# Patient Record
Sex: MALE | Race: WHITE
[De-identification: names, ages, dates, MRNs, and addresses within clinical notes are randomized per-mention and may not be internally consistent; named-entity substitution may affect disease eponyms.]

---

## 2018-09-11 ENCOUNTER — HOSPITAL ENCOUNTER (EMERGENCY)
Dept: HOSPITAL 97 - ER | Age: 53
LOS: 1 days | Discharge: HOME | End: 2018-09-12
Payer: COMMERCIAL

## 2018-09-11 DIAGNOSIS — R07.9: Primary | ICD-10-CM

## 2018-09-11 DIAGNOSIS — E03.9: ICD-10-CM

## 2018-09-11 LAB
ALBUMIN SERPL BCP-MCNC: 4 G/DL (ref 3.4–5)
ALP SERPL-CCNC: 48 U/L (ref 45–117)
ALT SERPL W P-5'-P-CCNC: 24 U/L (ref 12–78)
AST SERPL W P-5'-P-CCNC: 18 U/L (ref 15–37)
BUN BLD-MCNC: 20 MG/DL (ref 7–18)
CKMB CREATINE KINASE MB: 2.3 NG/ML (ref 0.3–3.6)
GLUCOSE SERPLBLD-MCNC: 105 MG/DL (ref 74–106)
HCT VFR BLD CALC: 40.2 % (ref 39.6–49)
INR BLD: 1.05
LYMPHOCYTES # SPEC AUTO: 2.3 K/UL (ref 0.7–4.9)
MAGNESIUM SERPL-MCNC: 2.2 MG/DL (ref 1.8–2.4)
MCH RBC QN AUTO: 32.8 PG (ref 27–35)
MCV RBC: 94.3 FL (ref 80–100)
NT-PROBNP SERPL-MCNC: 34 PG/ML (ref ?–125)
PMV BLD: 9.3 FL (ref 7.6–11.3)
POTASSIUM SERPL-SCNC: 3.7 MMOL/L (ref 3.5–5.1)
RBC # BLD: 4.26 M/UL (ref 4.33–5.43)
TROPONIN (EMERG DEPT USE ONLY): < 0.02 NG/ML (ref 0–0.04)

## 2018-09-11 PROCEDURE — 99284 EMERGENCY DEPT VISIT MOD MDM: CPT

## 2018-09-11 PROCEDURE — 85730 THROMBOPLASTIN TIME PARTIAL: CPT

## 2018-09-11 PROCEDURE — 85025 COMPLETE CBC W/AUTO DIFF WBC: CPT

## 2018-09-11 PROCEDURE — 82550 ASSAY OF CK (CPK): CPT

## 2018-09-11 PROCEDURE — 83735 ASSAY OF MAGNESIUM: CPT

## 2018-09-11 PROCEDURE — 36415 COLL VENOUS BLD VENIPUNCTURE: CPT

## 2018-09-11 PROCEDURE — 83880 ASSAY OF NATRIURETIC PEPTIDE: CPT

## 2018-09-11 PROCEDURE — 80048 BASIC METABOLIC PNL TOTAL CA: CPT

## 2018-09-11 PROCEDURE — 80076 HEPATIC FUNCTION PANEL: CPT

## 2018-09-11 PROCEDURE — 85610 PROTHROMBIN TIME: CPT

## 2018-09-11 PROCEDURE — 85379 FIBRIN DEGRADATION QUANT: CPT

## 2018-09-11 PROCEDURE — 84439 ASSAY OF FREE THYROXINE: CPT

## 2018-09-11 PROCEDURE — 93005 ELECTROCARDIOGRAM TRACING: CPT

## 2018-09-11 PROCEDURE — 84443 ASSAY THYROID STIM HORMONE: CPT

## 2018-09-11 PROCEDURE — 84484 ASSAY OF TROPONIN QUANT: CPT

## 2018-09-11 PROCEDURE — 71045 X-RAY EXAM CHEST 1 VIEW: CPT

## 2018-09-11 PROCEDURE — 82553 CREATINE MB FRACTION: CPT

## 2018-09-12 LAB — TSH SERPL DL<=0.05 MIU/L-ACNC: 6.23 UIU/ML (ref 0.36–3.74)

## 2018-09-12 NOTE — RAD REPORT
EXAM DESCRIPTION:  RAD - Chest Single View - 9/11/2018 11:52 pm

 

CLINICAL HISTORY:  CHEST PAIN

Chest pain.

 

COMPARISON:  Chest Pa And Lat (2 Views) dated 7/29/2016; CHEST PA AND LAT 2 VIEW dated 6/30/2008

 

FINDINGS:  Portable technique limits examination quality.

 

The lungs are grossly clear. The heart is normal in size. No displaced fractures.

 

IMPRESSION:  No acute intrathoracic process suspected.

## 2018-09-12 NOTE — EKG
Test Date:    2018-09-11               Test Time:    22:40:39

Technician:   PREMA                                    

                                                     

MEASUREMENT RESULTS:                                       

Intervals:                                           

Rate:         55                                     

VT:           146                                    

QRSD:         100                                    

QT:           454                                    

QTc:          434                                    

Axis:                                                

P:            45                                     

VT:           146                                    

QRS:          46                                     

T:            30                                     

                                                     

INTERPRETIVE STATEMENTS:                                       

                                                     

Sinus bradycardia

Otherwise normal ECG

Compared to ECG 10/08/2013 11:17:19

No significant changes



Electronically Signed On 09-12-18 12:08:43 CDT by Tulio Henry

## 2018-09-12 NOTE — ER
Nurse's Notes                                                                                     

 [facility]                                                                

Name: Refugio Castro                                                                                 

Age: 53 yrs                                                                                       

Sex: Male                                                                                         

: 1965                                                                                   

MRN: [id number]                                                                                   

Arrival Date: 2018                                                                          

Time: 22:14                                                                                       

Account#: [id number]                                                                            

Bed 26                                                                                            

Private MD: Meño Staples B                                                                     

Diagnosis: Chest pain                                                                             

                                                                                                  

Presentation:                                                                                     

                                                                                             

22:35 Presenting complaint: Patient states: he started feeling his heart race, felt dizzy     bb  

      with chest pain radiating down left arm, took his blood pressure several times and it       

      was elevated for him 130s/80s. Pt states his mother-in-law just  a few hours ago.       

      Pt does have an appointment with Dr Henry next week. Has been having similar symptoms     

      intermittently over the last 3 weeks. Transition of care: patient was not received from     

      another setting of care. Onset of symptoms was 2018. Risk Assessment: Do      

      you want to hurt yourself or someone else? Patient reports no desire to harm self or        

      others. Initial Sepsis Screen: Does the patient meet any 2 criteria? No. Patient's          

      initial sepsis screen is negative. Does the patient have a suspected source of              

      infection? No. Patient's initial sepsis screen is negative. Care prior to arrival: None.    

22:35 Method Of Arrival: Ambulatory                                                           bb  

22:35 Acuity: ELIAS 3                                                                           bb  

                                                                                                  

Historical:                                                                                       

- Allergies:                                                                                      

23:01 No Known Allergies;                                                                     bb  

- Home Meds:                                                                                      

23:01 levothyroxine oral [Active]; nasal spray [Active];                                      bb  

- PMHx:                                                                                           

23:01 Hypothyroidism;                                                                         bb  

- PSHx:                                                                                           

23:01 varicose veins; Knee surgery;                                                           bb  

                                                                                                  

- Immunization history:: Adult Immunizations up to date.                                          

- Social history:: Smoking status: Patient/guardian denies using tobacco,                         

  Patient/guardian denies using alcohol, street drugs.                                            

- Ebola Screening: : No symptoms or risks identified at this time.                                

                                                                                                  

                                                                                                  

Screenin:16 Abuse screen: Denies threats or abuse. Denies injuries from another. Nutritional        mg2 

      screening: No deficits noted. Tuberculosis screening: No symptoms or risk factors           

      identified. Fall Risk IV access (20 points).                                                

                                                                                                  

Assessment:                                                                                       

23:16 General: Appears in no apparent distress. comfortable, Behavior is calm, cooperative.   mg2 

      Pain: Denies pain. Neuro: Level of Consciousness is awake, alert, obeys commands,           

      Oriented to person, place, time, situation. Cardiovascular: Capillary refill < 3            

      seconds Patient's skin is warm and dry. Respiratory: Airway is patent Respiratory           

      effort is even, unlabored, Respiratory pattern is regular, symmetrical. GI: No signs        

      and/or symptoms were reported involving the gastrointestinal system. : No signs           

      and/or symptoms were reported regarding the genitourinary system. EENT: No signs and/or     

      symptoms were reported regarding the EENT system. Derm: Skin is intact, Skin is pink,       

      warm \T\ dry. normal. Musculoskeletal: No signs and/or symptoms reported regarding the      

      musculoskeletal system.                                                                     

                                                                                             

00:40 Reassessment: No changes from previously documented assessment. Patient and/or family   tl3 

      updated on plan of care and expected duration. Pain level reassessed. Patient is alert,     

      oriented x 3, equal unlabored respirations, skin warm/dry/pink.                             

01:47 Reassessment: Patient appears in no apparent distress at this time. Patient and/or      mg2 

      family updated on plan of care and expected duration. Pain level reassessed. Patient is     

      alert, oriented x 3, equal unlabored respirations, skin warm/dry/pink.                      

                                                                                                  

Vital Signs:                                                                                      

                                                                                             

22:30  / 87; Pulse 54; Resp 16 S; Temp 98.2(O); Pulse Ox 97% on R/A; Weight 77.11 kg    bb  

      (R); Height 6 ft. 2 in. (187.96 cm) (R); Pain 4/10;                                         

                                                                                             

00:40  / 68; Pulse 53; Resp 18; Pulse Ox 97% ;                                          tl3 

01:46  / 60; Pulse 50; Resp 18; Pulse Ox 100% on R/A; Pain 0/10;                        mg2 

                                                                                             

22:30 Body Mass Index 21.83 (77.11 kg, 187.96 cm)                                             bb  

                                                                                                  

ED Course:                                                                                        

                                                                                             

22:14 Patient arrived in ED.                                                                  ds1 

22:15 Meño Staples MD is Private Physician.                                                ds1 

22:30 Arm band placed on Patient placed in an exam room, on a stretcher, on cardiac monitor,  bb  

      on pulse oximetry. EKG completed in triage. Results shown to MD.                            

22:56 Triage completed.                                                                       bb  

23:00 Julio C Lowery MD is Attending Physician.                                                rn  

23:08 Attending Physician role handed off by Julio C Lowery MD                                 pkl 

23:08 Rajan Ledbetter MD is Attending Physician.                                                    pkl 

23:09 Anthony Tam, RN is Primary Nurse.                                                  mg2 

23:17 No provider procedures requiring assistance completed. Inserted saline lock: 20 gauge   mg2 

      in left antecubital area, using aseptic technique. Blood collected.                         

23:18 Patient has correct armband on for positive identification. Cardiac monitor on. Pulse   mg2 

      ox on. NIBP on.                                                                             

23:52 XRAY Chest (1 view) In Process Unspecified.                                             EDMS

                                                                                             

00:20 X-ray completed. Portable x-ray completed in exam room. Patient tolerated procedure     kw  

      well.                                                                                       

01:37 Tulio Henry MD is Referral Physician.                                               pkl 

01:46 IV discontinued, intact, bleeding controlled, No redness/swelling at site. Pressure     mg2 

      dressing applied.                                                                           

                                                                                                  

Administered Medications:                                                                         

                                                                                             

23:14 Drug: Aspirin 162 mg Route: PO;                                                         mg2 

                                                                                             

01:35 Follow up: Response: No adverse reaction; Marked relief of symptoms                     mg2 

                                                                                                  

                                                                                                  

Outcome:                                                                                          

01:37 Discharge ordered by MD.                                                                pkl 

01:46 Discharged to home ambulatory.                                                          mg2 

01:46 Condition: stable                                                                           

01:46 Discharge instructions given to patient, Instructed on discharge instructions, follow       

      up and referral plans. Demonstrated understanding of instructions, follow-up care.          

01:47 Patient left the ED.                                                                    mg2 

                                                                                                  

Signatures:                                                                                       

Dispatcher [employer]                           EDMS                                                 

Rajan Ledbetter MD MD   pkl                                                  

Marimar Najera                                ds1                                                  

Berta Morin, RN                     RN   bb                                                   

Julio C Lowery MD MD rn Whitley, Kimberlee kw Lowrey, Tammy, RN                       RN   tl3                                                  

Anthony Tam, KIMO                    RN   mg2                                                  

                                                                                                  

**************************************************************************************************

## 2018-09-12 NOTE — EKG
Test Date:    2018-09-12               Test Time:    01:28:08

Technician:   MG                                     

                                                     

MEASUREMENT RESULTS:                                       

Intervals:                                           

Rate:         46                                     

WY:           148                                    

QRSD:         100                                    

QT:           476                                    

QTc:          416                                    

Axis:                                                

P:            57                                     

WY:           148                                    

QRS:          65                                     

T:            47                                     

                                                     

INTERPRETIVE STATEMENTS:                                       

                                                     

Marked sinus bradycardia

Abnormal ECG

Compared to ECG 10/08/2013 11:17:19

No significant changes



Electronically Signed On 09-12-18 12:08:35 CDT by Tulio Henry

## 2018-09-12 NOTE — EDPHYS
Physician Documentation                                                                           

 [facility]                                                                

Name: Refugio Castro                                                                                 

Age: 53 yrs                                                                                       

Sex: Male                                                                                         

: 1965                                                                                   

MRN: [id number]                                                                                   

Arrival Date: 2018                                                                          

Time: 22:14                                                                                       

Account#: [id number]                                                                            

Bed 26                                                                                            

Private MD: Meño Staples B                                                                     

ED Physician Rajan Ledbetter                                                                             

HPI:                                                                                              

                                                                                             

23:08 This 53 yrs old  Male presents to ER via Ambulatory with complaints of High    pkl 

      Blood Pressure.                                                                             

23:08 The patient or guardian reports chest pain that is located primarily in the substernal  pkl 

      area. Onset: just prior to arrival. The pain radiates to the left arm. Associated signs     

      and symptoms: The patient has no apparent associated signs or symptoms. The chest pain      

      is described as dull. The patient has experienced similar episodes in the past, several     

      times, Over the last 3 weeks.                                                               

                                                                                                  

Historical:                                                                                       

- Allergies:                                                                                      

23:01 No Known Allergies;                                                                     bb  

- Home Meds:                                                                                      

23:01 levothyroxine oral [Active]; nasal spray [Active];                                      bb  

- PMHx:                                                                                           

23:01 Hypothyroidism;                                                                         bb  

- PSHx:                                                                                           

23:01 varicose veins; Knee surgery;                                                           bb  

                                                                                                  

- Immunization history:: Adult Immunizations up to date.                                          

- Social history:: Smoking status: Patient/guardian denies using tobacco,                         

  Patient/guardian denies using alcohol, street drugs.                                            

- Ebola Screening: : No symptoms or risks identified at this time.                                

                                                                                                  

                                                                                                  

ROS:                                                                                              

23:08 Eyes: Negative for injury, pain, redness, and discharge, ENT: Negative for injury,      pkl 

      pain, and discharge, Neck: Negative for injury, pain, and swelling.                         

23:08 Cardiovascular: Positive for chest pain.                                                    

23:08 Respiratory: Negative for cough, shortness of breath.                                       

23:08 Abdomen/GI: Negative for abdominal pain, nausea, vomiting, and diarrhea.                    

23:08 Back: Negative for acute changes.                                                           

23:08 : Negative for urinary symptoms.                                                          

23:08 MS/extremity: Negative for acute changes.                                                   

23:08 Skin: Negative for rash.                                                                    

23:08 Neuro: Negative for altered mental status.                                                  

                                                                                                  

Exam:                                                                                             

23:08 Head/Face:  Normocephalic, atraumatic. Eyes:  Pupils equal round and reactive to light, pkl 

      extra-ocular motions intact.  Lids and lashes normal.  Conjunctiva and sclera are           

      non-icteric and not injected.  Cornea within normal limits.  Periorbital areas with no      

      swelling, redness, or edema. ENT:  Nares patent. No nasal discharge, no septal              

      abnormalities noted.  Tympanic membranes are normal and external auditory canals are        

      clear.  Oropharynx with no redness, swelling, or masses, exudates, or evidence of           

      obstruction, uvula midline.  Mucous membranes moist. Neck:  Trachea midline, no             

      thyromegaly or masses palpated, and no cervical lymphadenopathy.  Supple, full range of     

      motion without nuchal rigidity, or vertebral point tenderness.  No Meningismus.             

      Chest/axilla:  Normal chest wall appearance and motion.  Nontender with no deformity.       

      No lesions are appreciated. Cardiovascular:  Regular rate and rhythm with a normal S1       

      and S2.  No gallops, murmurs, or rubs.  Normal PMI, no JVD.  No pulse deficits.             

      Respiratory:  Lungs have equal breath sounds bilaterally, clear to auscultation and         

      percussion.  No rales, rhonchi or wheezes noted.  No increased work of breathing, no        

      retractions or nasal flaring. Abdomen/GI:  Soft, non-tender, with normal bowel sounds.      

      No distension or tympany.  No guarding or rebound.  No evidence of tenderness               

      throughout. Back:  No spinal tenderness.  No costovertebral tenderness.  Full range of      

      motion. Skin:  Warm, dry with normal turgor.  Normal color with no rashes, no lesions,      

      and no evidence of cellulitis. MS/ Extremity:  Pulses equal, no cyanosis.                   

      Neurovascular intact.  Full, normal range of motion. Neuro:  Awake and alert, GCS 15,       

      oriented to person, place, time, and situation.  Cranial nerves II-XII grossly intact.      

      Motor strength 5/5 in all extremities.  Sensory grossly intact.  Cerebellar exam            

      normal.  Normal gait.                                                                       

                                                                                                  

Vital Signs:                                                                                      

22:30  / 87; Pulse 54; Resp 16 S; Temp 98.2(O); Pulse Ox 97% on R/A; Weight 77.11 kg    bb  

      (R); Height 6 ft. 2 in. (187.96 cm) (R); Pain 4/10;                                         

                                                                                             

00:40  / 68; Pulse 53; Resp 18; Pulse Ox 97% ;                                          tl3 

01:46  / 60; Pulse 50; Resp 18; Pulse Ox 100% on R/A; Pain 0/10;                        mg2 

                                                                                             

22:30 Body Mass Index 21.83 (77.11 kg, 187.96 cm)                                             bb  

                                                                                                  

MDM:                                                                                              

                                                                                             

23:01 Patient medically screened.                                                             rn  

23:07 Patient medically screened.                                                             rn  

23:07 Patient medically screened.                                                             rn  

                                                                                             

01:34 Data reviewed: vital signs, nurses notes, lab test result(s), EKG, radiologic studies,  pkl 

      plain films. ED course: Patient feeling better. Asymptomatic. Patient want to go home       

      now. Does not want repeat Troponin done. Will follow up with Dr. Henry next week.         

      Advised to return if necessary.                                                             

01:37 Patient medically screened.                                                             pkl 

                                                                                                  

                                                                                             

23:05 Order name: Basic Metabolic Panel; Complete Time: :                                 pkl 

                                                                                             

23:05 Order name: CBC with Diff; Complete Time: :                                         pkl 

                                                                                             

23:05 Order name: Ckmb; Complete Time: :                                                  pkl 

                                                                                             

23:05 Order name: CPK; Complete Time: :                                                   pkl 

                                                                                             

23:05 Order name: LFT's; Complete Time: :                                                 pkl 

                                                                                             

23:05 Order name: Magnesium; Complete Time: :                                             pkl 

                                                                                             

23:05 Order name: NT PRO-BNP; Complete Time: :                                            pkl 

                                                                                             

23:05 Order name: PT-INR; Complete Time:                                                 pkl 

                                                                                             

23:05 Order name: Ptt, Activated; Complete Time:                                         pkl 

                                                                                             

23:05 Order name: Troponin (emerg Dept Use Only); Complete Time: :                        pkl 

                                                                                             

23:05 Order name: D-Dimer; Complete Time: :                                               pkl 

                                                                                             

23:11 Order name: TSH; Complete Time: :                                                   pkl 

                                                                                             

00:03 Order name: T4 Free; Complete Time: :                                               EDMS

                                                                                             

23:05 Order name: XRAY Chest (1 view)                                                         pkl 

                                                                                             

23:05 Order name: EKG; Complete Time: 23:                                                   pkl 

                                                                                             

23:05 Order name: Cardiac monitoring; Complete Time: :                                    pkl 

                                                                                             

23:05 Order name: EKG - Nurse/Tech; Complete Time: 23:                                      pkl 

                                                                                             

23:05 Order name: IV Saline Lock; Complete Time: :                                        pkl 

                                                                                             

23:05 Order name: Labs collected and sent; Complete Time: 23:                               pkl 

                                                                                             

23:05 Order name: O2 Per Protocol; Complete Time: 23:                                       pkl 

                                                                                             

23:05 Order name: O2 Sat Monitoring; Complete Time: 23:                                     pkl 

                                                                                             

01:24 Order name: EKG; Complete Time: 01:24                                                   mg2 

                                                                                                  

Administered Medications:                                                                         

                                                                                             

23:14 Drug: Aspirin 162 mg Route: PO;                                                         mg2 

                                                                                             

01:35 Follow up: Response: No adverse reaction; Marked relief of symptoms                     mg2 

                                                                                                  

                                                                                                  

Disposition:                                                                                      

18 01:37 Discharged to Home. Impression: Chest pain.                                        

- Condition is Stable.                                                                            

                                                                                                  

                                                                                                  

- Medication Reconciliation Form, Thank You Letter, Antibiotic Education, Prescription            

  Opioid Use form.                                                                                

- Follow up: Tulio Henry MD; When: 5 - 6 days; Reason: Re-evaluation by your                  

  physician.                                                                                      

- Problem is new.                                                                                 

- Symptoms have improved.                                                                         

                                                                                                  

                                                                                                  

                                                                                                  

Signatures:                                                                                       

Dispatcher [employer]                           EDMS                                                 

Rajan Ledbetter MD MD pkl Ballard, Brenda, RN                     RN   Julio C Britton MD MD rn Gardose, Michele, RN                    RN   mg2                                                  

                                                                                                  

Corrections: (The following items were deleted from the chart)                                    

01:35 01:24 TROPONIN (EMERG DEPT USE ONLY)+[email or web address] ordered. [facility]                            EDMS

01:47 01:37 2018 01:37 Discharged to Home. Impression: Chest pain. Condition is Stable. mg2 

      Forms are Medication Reconciliation Form, Thank You Letter, Antibiotic Education,           

      Prescription Opioid Use. Follow up: Tulio Henry; When: 5 - 6 days; Reason:                

      Re-evaluation by your physician. Problem is new. Symptoms have improved. pkl                

                                                                                                  

**************************************************************************************************

## 2020-07-15 ENCOUNTER — HOSPITAL ENCOUNTER (EMERGENCY)
Dept: HOSPITAL 97 - ER | Age: 55
Discharge: HOME | End: 2020-07-15
Payer: COMMERCIAL

## 2020-07-15 DIAGNOSIS — Z01.30: Primary | ICD-10-CM

## 2020-07-15 PROCEDURE — 99283 EMERGENCY DEPT VISIT LOW MDM: CPT

## 2020-07-15 NOTE — EDPHYS
Physician Documentation                                                                           

 [facility]                                                                 

Name: Refugio Castro                                                                                 

Age: 55 yrs                                                                                       

Sex: Male                                                                                         

: 1965                                                                                   

MRN: [id number]                                                                                   

Arrival Date: 07/15/2020                                                                          

Time: 23:25                                                                                       

Account#: [id number]                                                                            

Bed External Waiting                                                                              

Private MD: Meño Staples B                                                                     

ED Physician Jorge Lynn                                                                      

HPI:                                                                                              

07/15                                                                                             

23:33 This 55 yrs old  Male presents to ER via Unassigned with complaints of Blood   kb  

      pressure check.                                                                             

23:33 "I just want to get my blood pressure checked.". The patient has not experienced        kb  

      similar symptoms in the past. The patient has been recently seen by a physician:. Pt        

      states he came in to get his blood pressure checked. STates "I would have gotten it         

      done at one of the pharmacies, but they won't let you use them right now with covid         

      going on." States he has been having some neck and left arm pain for months and has had     

      multiple cardiac tests done to rule out a heart problem. Has more imaging scheduled for     

      next Tuesday. .                                                                             

                                                                                                  

Historical:                                                                                       

- Allergies:                                                                                      

23:41 No Known Allergies;                                                                     sg  

- PMHx:                                                                                           

23:41 Hypothyroidism;                                                                         sg  

- PSHx:                                                                                           

23:41 varicose veins; Knee surgery;                                                           sg  

                                                                                                  

- Immunization history:: Adult Immunizations up to date.                                          

- Social history:: Smoking status: Patient denies any tobacco usage or history of.                

                                                                                                  

                                                                                                  

ROS:                                                                                              

23:33 Constitutional: Negative for fever, chills, and weight loss, Neck: Negative for injury, kb  

      pain, and swelling, Cardiovascular: Negative for chest pain, palpitations, and edema,       

      Respiratory: Negative for shortness of breath, cough, wheezing, and pleuritic chest         

      pain, Abdomen/GI: Negative for abdominal pain, nausea, vomiting, diarrhea, and              

      constipation, Back: Negative for injury and pain, MS/Extremity: Negative for injury and     

      deformity, Skin: Negative for injury, rash, and discoloration, Neuro: Negative for          

      headache, weakness, numbness, tingling, and seizure.                                        

                                                                                                  

Exam:                                                                                             

23:33 Constitutional:  This is a well developed, well nourished patient who is awake, alert,  kb  

      and in no acute distress. Head/Face:  Normocephalic, atraumatic. Neck:  Trachea             

      midline, no thyromegaly or masses palpated, and no cervical lymphadenopathy.  Supple,       

      full range of motion without nuchal rigidity, or vertebral point tenderness.  No            

      Meningismus. Chest/axilla:  Normal chest wall appearance and motion.  Nontender with no     

      deformity.  No lesions are appreciated. Cardiovascular:  Regular rate and rhythm with a     

      normal S1 and S2.  No gallops, murmurs, or rubs.  Normal PMI, no JVD.  No pulse             

      deficits. Respiratory:  Lungs have equal breath sounds bilaterally, clear to                

      auscultation and percussion.  No rales, rhonchi or wheezes noted.  No increased work of     

      breathing, no retractions or nasal flaring. Abdomen/GI:  Soft, non-tender, with normal      

      bowel sounds.  No distension or tympany.  No guarding or rebound.  No evidence of           

      tenderness throughout. Skin:  Warm, dry with normal turgor.  Normal color with no           

      rashes, no lesions, and no evidence of cellulitis. MS/ Extremity:  Pulses equal, no         

      cyanosis.  Neurovascular intact.  Full, normal range of motion. Neuro:  Awake and           

      alert, GCS 15, oriented to person, place, time, and situation.  Cranial nerves II-XII       

      grossly intact.  Motor strength 5/5 in all extremities.  Sensory grossly intact.            

      Cerebellar exam normal.  Normal gait.                                                       

                                                                                                  

Vital Signs:                                                                                      

23:38  / 80; Pulse 60; Resp 18; Temp 98.4; Pulse Ox 100% ; Pain 0/10;                   kb  

                                                                                                  

MDM:                                                                                              

23:32 Patient medically screened.                                                               

23:36 Data reviewed: vital signs, nurses notes. Data interpreted: Pulse oximetry: on room air kb  

      is 100 %. Interpretation: normal. Counseling: I had a detailed discussion with the          

      patient and/or guardian regarding: the historical points, exam findings, and any            

      diagnostic results supporting the discharge/admit diagnosis, the need for outpatient        

      follow up, a family practitioner, to return to the emergency department if symptoms         

      worsen or persist or if there are any questions or concerns that arise at home.             

23:38 ED course: I was talking to pt as I was obtaining vital signs. When blood pressure was  kb  

      measured pt stood up to leave stating "Thanks, that's all I needed." Pt did not want to     

      have anything else done. Reported he didn't need any other testing. .                       

                                                                                                  

Administered Medications:                                                                         

No medications were administered                                                                  

                                                                                                  

                                                                                                  

Disposition:                                                                                      

23:36 Encounter for blood pressure check.                                                     kirit  

                                                                                             

08:48 Co-signature as Attending Physician, Jorge Lynn MD I agree with the assessment and  pedro 

      plan of care.                                                                               

                                                                                                  

Disposition:                                                                                      

07/15/20 23:37 Discharged to Home. Impression: Encounter for screening, unspecified.              

- Condition is Stable.                                                                            

                                                                                                  

                                                                                                  

- Medication Reconciliation Form, Thank You Letter, Antibiotic Education, Prescription            

  Opioid Use form.                                                                                

- Follow up: Emergency Department; When: As needed; Reason: Worsening of condition.               

  Follow up: Meño Staples MD; When: 2 - 3 days; Reason: Recheck today's complaints,            

  Continuance of care, Re-evaluation by your physician.                                           

                                                                                                  

                                                                                                  

                                                                                                  

Signatures:                                                                                       

Vicki Alvarez, BERENICEP-C                 FNP-Ckb                                                   

Ahsan Kong RN                         RN   Jorge Anderson MD MD   [facility]                                                  

                                                                                                  

Corrections: (The following items were deleted from the chart)                                    

07/15                                                                                             

23:48 23:37 07/15/2020 23:37 Discharged to Home. Impression: Encounter for screening,         sg  

      unspecified. Condition is Stable. Forms are Medication Reconciliation Form, Thank You       

      Letter, Antibiotic Education, Prescription Opioid Use. Follow up: Emergency Department;     

      When: As needed; Reason: Worsening of condition. Follow up: Meño Staples; When: 2 - 3      

      days; Reason: Recheck today's complaints, Continuance of care, Re-evaluation by your        

      physician. kb                                                                               

                                                                                                  

**************************************************************************************************

## 2020-07-15 NOTE — XMS REPORT
Continuity of Care Document

                            Created on:2020



Patient:SAROJ DALLAS

Sex:Male

:1965

External Reference #:[id number]





Demographics







                          Address                   [address]



                                                    [place] [address]

 

                          Home Phone                [phone]

 

                          Work Phone                [phone]

 

                          Email Address             [email or web address]

 

                          Preferred Language        Unknown

 

                          Marital Status            Unknown

 

                          [employer] Affiliation     Unknown

 

                          Race                      Unknown

 

                          Additional Race(s)        Unavailable

 

                          Ethnic Group              Unknown









Author







                          Organization              [facility]

t

 

                          Address                   [address]



                                                    [place] [address]

 

                          Phone                     [phone]









Care Team Providers







                    Name                Role                Phone

 

                    Unavailable         Unavailable         Unavailable









Problems

This patient has no known problems.



Allergies, Adverse Reactions, Alerts

This patient has no known allergies or adverse reactions.



Medications

This patient has no known medications.



Procedures

This patient has no known procedures.



Results

This patient has no known results.

## 2020-07-15 NOTE — ER
Nurse's Notes                                                                                     

 [facility]                                                                 

Name: Refugio Castro                                                                                 

Age: 55 yrs                                                                                       

Sex: Male                                                                                         

: 1965                                                                                   

MRN: [id number]                                                                                   

Arrival Date: 07/15/2020                                                                          

Time: 23:25                                                                                       

Account#: [id number]                                                                            

Bed External Waiting                                                                              

Private MD: Meño Staples B                                                                     

Diagnosis: Encounter for screening, unspecified                                                   

                                                                                                  

Presentation:                                                                                     

07/15                                                                                             

23:33 Chief complaint: Patient states: With the COVID 19 stuff going on I havent been able to sg  

      get into the pharmacy to use their machine to have my BP taken, So Im just here to make     

      sure that my blood pressure is OK. Coronavirus screen: Proceed with normal triage.          

      Ebola Screen: Patient negative for fever greater than or equal to 101.5 degrees             

      Fahrenheit, and additional compatible Ebola Virus Disease symptoms Patient denies           

      exposure to infectious person. Patient denies travel to an Ebola-affected area in the       

      21 days before illness onset. No symptoms or risks identified at this time. Initial         

      Sepsis Screen: Does the patient meet any 2 criteria? No. Patient's initial sepsis           

      screen is negative. Does the patient have a suspected source of infection? No.              

      Patient's initial sepsis screen is negative. Risk Assessment: Do you want to hurt           

      yourself or someone else? Patient reports no desire to harm self or others. Onset of        

      symptoms was July 15, 2020. Care prior to arrival: None. Transition of care: patient        

      was not received from another setting of care.                                              

23:33 Method Of Arrival: Ambulatory                                                           sg  

23:33 Acuity: ELIAS 5                                                                           sg  

                                                                                                  

Triage Assessment:                                                                                

23:42 General: Appears in no apparent distress. well groomed, well developed, well nourished, sg  

      Behavior is calm, cooperative, appropriate for age. Pain: Denies pain. Neuro: No            

      deficits noted. Denies weakness blurred vision dizziness, difficulty swallowing,            

      paresthesias numbness headache photophobia diplopia. Cardiovascular: Capillary refill       

      is brisk in bilateral fingers Patient's skin is warm and dry. Chest pain is denied.         

      Respiratory: Airway is patent Respiratory effort is even, unlabored, Respiratory            

      pattern is regular, symmetrical. GI: No signs and/or symptoms were reported involving       

      the gastrointestinal system. : No signs and/or symptoms were reported regarding the       

      genitourinary system. Derm: Skin is pink, warm \T\ dry. Musculoskeletal: Circulation,       

      motion, and sensation intact. Range of motion: intact in all extremities.                   

                                                                                                  

Historical:                                                                                       

- Allergies:                                                                                      

23:41 No Known Allergies;                                                                     sg  

- PMHx:                                                                                           

23:41 Hypothyroidism;                                                                         sg  

- PSHx:                                                                                           

23:41 varicose veins; Knee surgery;                                                           sg  

                                                                                                  

- Immunization history:: Adult Immunizations up to date.                                          

- Social history:: Smoking status: Patient denies any tobacco usage or history of.                

                                                                                                  

                                                                                                  

Assessment:                                                                                       

                                                                                             

00:12 Reassessment: pt removed BP cuff and stated " well, that's all I needed to know, Im     sg  

      gonna go now." Sandra FNP at bedside with pt and request that more studies may be        

      necessary. pt refused and is discharged to home.                                            

                                                                                                  

Vital Signs:                                                                                      

07/15                                                                                             

23:38  / 80; Pulse 60; Resp 18; Temp 98.4; Pulse Ox 100% ; Pain 0/10;                   kb  

                                                                                                  

ED Course:                                                                                        

23:25 Patient arrived in ED.                                                                  es  

23:25 Meño Staples MD is Private Physician.                                                es  

23:30 Arm band placed on.                                                                     sg  

23:31 [facility]                                                        kb  

23:31 Jorge Lynn MD is Attending Physician.                                             kb  

23:33 Patient has correct armband on for positive identification. Bed in low position. Call   sg  

      light in reach. Side rails up X2. Pulse ox on. NIBP on. Warm blanket given. Head of bed     

      elevated.                                                                                   

23:37 Meño Staples MD is Referral Physician.                                               kb  

23:40 Triage completed.                                                                       sg  

23:42 No provider procedures requiring assistance completed. Patient did not have IV access   sg  

      during this emergency room visit.                                                           

                                                                                                  

Administered Medications:                                                                         

No medications were administered                                                                  

                                                                                                  

                                                                                                  

Outcome:                                                                                          

23:37 Discharge ordered by MD.                                                                kb  

23:45 Discharged to home ambulatory.                                                          sg  

23:45 Condition: good                                                                             

23:45 Discharge instructions given to patient, Instructed on discharge instructions, follow       

      up and referral plans. medication usage, safety practices, Demonstrated understanding       

      of instructions, follow-up care.                                                            

23:48 Patient left the ED.                                                                    sg  

                                                                                                  

Signatures:                                                                                       

Vicki Alvarez FNP-C FNP-Ahsan Vicente, RN                         RN   Antonieta Frank                                                   

                                                                                                  

**************************************************************************************************

## 2020-07-16 VITALS — DIASTOLIC BLOOD PRESSURE: 80 MMHG | SYSTOLIC BLOOD PRESSURE: 126 MMHG | OXYGEN SATURATION: 100 % | TEMPERATURE: 98.4 F

## 2022-07-15 ENCOUNTER — HOSPITAL ENCOUNTER (EMERGENCY)
Dept: HOSPITAL 97 - ER | Age: 57
Discharge: HOME | End: 2022-07-15
Payer: COMMERCIAL

## 2022-07-15 VITALS — OXYGEN SATURATION: 100 % | SYSTOLIC BLOOD PRESSURE: 121 MMHG | DIASTOLIC BLOOD PRESSURE: 77 MMHG | TEMPERATURE: 98.2 F

## 2022-07-15 DIAGNOSIS — R22.42: Primary | ICD-10-CM

## 2022-07-15 DIAGNOSIS — E03.9: ICD-10-CM

## 2022-07-15 DIAGNOSIS — Z87.828: ICD-10-CM

## 2022-07-15 PROCEDURE — 99283 EMERGENCY DEPT VISIT LOW MDM: CPT

## 2022-07-15 PROCEDURE — 93971 EXTREMITY STUDY: CPT

## 2022-07-15 PROCEDURE — 93926 LOWER EXTREMITY STUDY: CPT

## 2022-07-15 NOTE — RAD REPORT
[facility] DES[facility]  [facility]

 

CLINICAL HISTORY:  left leg pain

 

COMPARISON:  None

 

FINDINGS:  Left common femoral, superficial femoral, popliteal and  posterior tibial veins are compre
ssible and demonstrate augmentation. Doppler demonstrates good flow.

 

Grayscale, color and spectral analysis performed on all vessels

 

IMPRESSION:  No evidence of deep venous thrombosis involving the left lower extremity.

## 2022-07-15 NOTE — EDPHYS
Physician Documentation                                                                           

 [facility]                                                                 

Name: Refugio Castro                                                                                 

Age: 57 yrs                                                                                       

Sex: Male                                                                                         

: 1965                                                                                   

MRN: [id number]                                                                                   

Arrival Date: 07/15/2022                                                                          

Time: 17:22                                                                                       

Account#: [id number]                                                                            

Bed 10                                                                                            

Private MD:                                                                                       

ED Physician Jorge Lynn                                                                      

HPI:                                                                                              

                                                                                             

00:03 This 57 yrs old Male presents to ER via Ambulatory with complaints of Leg Pain.         kb  

00:03 The patient presents with swelling. The complaints affect the left calf. Context: The   kb  

      problem was sustained at home, the patient can fully bear weight, the patient is able       

      to ambulate, Problem is a result from a previous injury: Yes. Onset: The                    

      symptoms/episode began/occurred today. Modifying factors: The symptoms are alleviated       

      by nothing. the symptoms are aggravated by nothing. Associated signs and symptoms:          

      Pertinent positives: swelling, Pertinent negatives calf tenderness, fever, nausea,          

      numbness, rash, tingling, vomiting, warmth, weakness. Treatment prior to arrival            

      includes: no previous treatment. Severity of symptoms: At their worst the symptoms were     

      mild, moderate, in the emergency department the symptoms are unchanged. The patient has     

      not experienced similar symptoms in the past. The patient has not recently seen a           

      physician. Pt reports left calf swelling that has been going on since a running injury.     

      States he goes to PT because of the injury. Went today and they were concerned about        

      the swelling to left calf so they sent him to get US to rule out clot.                      

                                                                                                  

Historical:                                                                                       

- Allergies:                                                                                      

07/15                                                                                             

18:04 No Known Allergies;                                                                     iw  

- Home Meds:                                                                                      

18:04 levothyroxine oral once daily [Active];                                                 iw  

- PMHx:                                                                                           

18:04 Hypothyroidism;                                                                         iw  

- PSHx:                                                                                           

18:04 meniscus; Vasectomy; varicose veins;                                                    iw  

                                                                                                  

                                                                                                  

                                                                                                  

ROS:                                                                                              

                                                                                             

00:02 Constitutional: Negative for fever, chills, and weight loss.                            kb  

      MS/extremity: Positive for swelling, of the left calf.                                      

      All other systems are negative.                                                             

                                                                                                  

Exam:                                                                                             

00:02 Constitutional:  This is a well developed, well nourished patient who is awake, alert,  kb  

      and in no acute distress. Head/Face:  Normocephalic, atraumatic. ENT:  Moist Mucous         

      membranes Cardiovascular:  Regular rate and rhythm with a normal S1 and S2.  No             

      gallops, murmurs, or rubs.  No pulse deficits. Respiratory:  Respirations even and          

      unlabored. No increased work of breathing. Talking in full sentences Skin:  Warm, dry       

      with normal turgor.  Normal color. MS/ Extremity:  Pulses equal, no cyanosis.               

      Neurovascular intact.  Full, normal range of motion. Neuro:  Awake and alert, GCS 15,       

      oriented to person, place, time, and situation. Moves all extremities. Normal gait.         

      Psych:  Awake, alert, with orientation to person, place and time.  Behavior, mood, and      

      affect are within normal limits.                                                            

                                                                                                  

Vital Signs:                                                                                      

07/15                                                                                             

18:03  / 77; Pulse 53; Resp 16; Temp 98.2; Pulse Ox 100% on R/A;                        iw  

                                                                                                  

MDM:                                                                                              

19:14 Patient medically screened.                                                             [facility] 

                                                                                             

00:02 Data reviewed: vital signs, nurses notes. Data interpreted: Pulse oximetry: on room air kb  

      is 100 %. Interpretation: normal. Counseling: I had a detailed discussion with the          

      patient and/or guardian regarding: the historical points, exam findings, and any            

      diagnostic results supporting the discharge/admit diagnosis, radiology results, the         

      need for outpatient follow up, a family practitioner, to return to the emergency            

      department if symptoms worsen or persist or if there are any questions or concerns that     

      arise at home.                                                                              

                                                                                                  

07/15                                                                                             

18:09 Order name: US Extremity Venous Unilateral Ltd; Complete Time: 19:04                      

07/15                                                                                             

18:35 Order name: Lower Extremity Artery Uni Ltd; Complete Time: 19:04                        EDMS

                                                                                                  

Administered Medications:                                                                         

No medications were administered                                                                  

                                                                                                  

                                                                                                  

Disposition Summary:                                                                              

07/15/22 19:21                                                                                    

Discharge Ordered                                                                                 

      Location: Home                                                                          kb  

      Condition: Stable                                                                       kb  

      Diagnosis                                                                                   

        - Swelling to left calf s/p old injury                                                kb  

      Followup:                                                                               kb  

        - With: Emergency Department                                                               

        - When: As needed                                                                          

        - Reason: Worsening of condition                                                           

      Followup:                                                                               kb  

        - With: Private Physician                                                                  

        - When: 2 - 3 days                                                                         

        - Reason: Recheck today's complaints, Continuance of care, Re-evaluation by your           

      physician                                                                                   

      Discharge Instructions:                                                                     

        - Discharge Summary Sheet                                                             kb  

        - Edema, Easy-to-Read                                                                 kb  

      Forms:                                                                                      

        - Medication Reconciliation Form                                                      kb  

        - Thank You Letter                                                                    kb  

        - Antibiotic Education                                                                kb  

        - Prescription Opioid Use                                                             kb  

Addendum:                                                                                         

2022                                                                                        

     13:53 Co-signature as Attending Physician, Jorge Lynn MD I agree with the assessment and  c
ha

           plan of care.                                                                          

                                                                                                  

Signatures:                                                                                       

Dispatcher [employer]                           EDVicki Rey FNP-C FNP-Ckb                                                   

Jorge Lynn MD MD cha Williams, Irene, RN                     RN   iw                                                   

                                                                                                  

**************************************************************************************************

## 2022-07-15 NOTE — RAD REPORT
EXAM DESCRIPTION:  US - Lower Extremity Artery Uni Ltd - 7/15/2022 6:49 pm

 

CLINICAL HISTORY:  Leg pain

 

COMPARISON:  None

 

FINDINGS:

The left common femoral, superficial femoral, popliteal and left posterior tibial arteries demonstrat
e triphasic waveforms

 

The left dorsalis pedis arteries demonstrate biphasic waveforms

 

No occlusion/high-grade stenosis

 

Grayscale, color and spectral analysis performed on all vessels

 

IMPRESSION:

Minimal distal lower extremity arterial disease

## 2022-07-15 NOTE — ER
Nurse's Notes                                                                                     

 [facility]                                                                 

Name: Refugio Castro                                                                                 

Age: 57 yrs                                                                                       

Sex: Male                                                                                         

: 1965                                                                                   

MRN: [id number]                                                                                   

Arrival Date: 07/15/2022                                                                          

Time: 17:22                                                                                       

Account#: [id number]                                                                            

Bed 10                                                                                            

Private MD:                                                                                       

Diagnosis: Swelling to left calf s/p old injury                                                   

                                                                                                  

Presentation:                                                                                     

07/15                                                                                             

18:03 Chief complaint: Patient states: my doctor said I need to come in because i have        iw  

      throbbing in my left calf, has been hurting for a month. Coronavirus screen: At this        

      time, the client does not indicate any symptoms associated with coronavirus-19. Ebola       

      Screen: Patient negative for fever greater than or equal to 101.5 degrees Fahrenheit,       

      and additional compatible Ebola Virus Disease symptoms Patient denies exposure to           

      infectious person. Patient denies travel to an Ebola-affected area in the 21 days           

      before illness onset. No symptoms or risks identified at this time. Initial Sepsis          

      Screen: Does the patient meet any 2 criteria? No. Patient's initial sepsis screen is        

      negative. Does the patient have a suspected source of infection? No. Patient's initial      

      sepsis screen is negative. Risk Assessment: Do you want to hurt yourself or someone         

      else? Patient reports no desire to harm self or others. Onset of symptoms was 2022.    

18:03 Method Of Arrival: Ambulatory                                                           iw  

18:03 Acuity: ELIAS 3                                                                           iw  

                                                                                                  

Triage Assessment:                                                                                

19:50 General: Appears in no apparent distress. comfortable, Behavior is calm, cooperative.   lg3 

                                                                                                  

Historical:                                                                                       

- Allergies:                                                                                      

18:04 No Known Allergies;                                                                     iw  

- Home Meds:                                                                                      

18:04 levothyroxine oral once daily [Active];                                                 iw  

- PMHx:                                                                                           

18:04 Hypothyroidism;                                                                         iw  

- PSHx:                                                                                           

18:04 meniscus; Vasectomy; varicose veins;                                                    iw  

                                                                                                  

                                                                                                  

                                                                                                  

Screenin:50 Abuse screen: Denies threats or abuse. Denies injuries from another. Nutritional        lg3 

      screening: No deficits noted. Tuberculosis screening: No symptoms or risk factors           

      identified. Fall Risk None identified.                                                      

                                                                                                  

Assessment:                                                                                       

19:51 General: Appears in no apparent distress. comfortable, Behavior is calm, cooperative.   lg3 

      Pain: Complains of pain in leg. Neuro: No deficits noted. Level of Consciousness is         

      awake, alert, obeys commands, Oriented to person, place, time, situation.                   

      Cardiovascular: No deficits noted. Denies chest pain, shortness of breath, Capillary        

      refill < 3 seconds Clubbing of nail beds is absent JVD is absent Patient's skin is warm     

      and dry. Respiratory: No deficits noted. Airway is patent Trachea midline Respiratory       

      effort is even, unlabored, Respiratory pattern is regular, symmetrical. GI: No deficits     

      noted. No signs and/or symptoms were reported involving the gastrointestinal system.        

      : No deficits noted. No signs and/or symptoms were reported regarding the                 

      genitourinary system. EENT: No deficits noted. No signs and/or symptoms were reported       

      regarding the EENT system. Derm: No deficits noted. No signs and/or symptoms reported       

      regarding the dermatologic system. Skin is intact, is healthy with good turgor, Skin is     

      dry, Skin temperature is warm. Musculoskeletal: No deficits noted. Circulation, motion,     

      and sensation intact. Range of motion: intact in all extremities.                           

19:52 General: provider notified pt of discharge instructions. pt left before signing         lg3 

      discharge paperwork. .                                                                      

                                                                                                  

Vital Signs:                                                                                      

18:03  / 77; Pulse 53; Resp 16; Temp 98.2; Pulse Ox 100% on R/A;                        iw  

                                                                                                  

ED Course:                                                                                        

17:22 Patient arrived in ED.                                                                  ja2 

18:04 Triage completed.                                                                       iw  

18:05 Arm band placed on.                                                                     iw  

18:10 [facility]                                                        kb  

18:10 Jorge Lynn MD is Attending Physician.                                             kb  

18:50 US Extremity Venous Unilateral Ltd In Process Unspecified.                              EDMS

18:51 Lower Extremity Artery Uni Ltd In Process Unspecified.                                  EDMS

19:51 No provider procedures requiring assistance completed. Patient did not have IV access   lg3 

      during this emergency room visit.                                                           

                                                                                                  

Administered Medications:                                                                         

No medications were administered                                                                  

                                                                                                  

                                                                                                  

Medication:                                                                                       

19:51 VIS not applicable for this client.                                                     lg3 

                                                                                                  

Outcome:                                                                                          

19:21 Discharge ordered by MD.                                                                kb  

19:51 Discharged to home ambulatory.                                                          lg3 

19:51 Condition: stable                                                                           

19:51 Discharge instructions given to patient, Instructed on discharge instructions,              

      Demonstrated understanding of instructions.                                                 

19:53 Patient left the ED.                                                                    lg3 

                                                                                                  

Signatures:                                                                                       

Dispatcher [employer]                           EDMS                                                 

Vicki Alvarez FNP-C FNP-Ckb Williams, Irene, RN RN   iw                                                   

Arlen Erickson RN                       RN   lg3                                                  

Elizabeth Hernandez                                                  

                                                                                                  

**************************************************************************************************